# Patient Record
Sex: FEMALE | Race: WHITE | NOT HISPANIC OR LATINO | Employment: FULL TIME | URBAN - METROPOLITAN AREA
[De-identification: names, ages, dates, MRNs, and addresses within clinical notes are randomized per-mention and may not be internally consistent; named-entity substitution may affect disease eponyms.]

---

## 2022-03-17 ENCOUNTER — APPOINTMENT (OUTPATIENT)
Dept: LAB | Facility: HOSPITAL | Age: 27
End: 2022-03-17
Attending: PLASTIC SURGERY
Payer: COMMERCIAL

## 2022-03-17 DIAGNOSIS — Z01.812 PRE-OPERATIVE LABORATORY EXAMINATION: ICD-10-CM

## 2022-03-17 LAB
APTT PPP: 31 SECONDS (ref 23–37)
BASOPHILS # BLD AUTO: 0.1 THOUSANDS/ΜL (ref 0–0.1)
BASOPHILS NFR BLD AUTO: 1 % (ref 0–1)
EOSINOPHIL # BLD AUTO: 0.1 THOUSAND/ΜL (ref 0–0.4)
EOSINOPHIL NFR BLD AUTO: 2 % (ref 0–6)
ERYTHROCYTE [DISTWIDTH] IN BLOOD BY AUTOMATED COUNT: 14 %
HCT VFR BLD AUTO: 37.4 % (ref 36–46)
HGB BLD-MCNC: 12.8 G/DL (ref 12–16)
INR PPP: 1.09 (ref 0.84–1.19)
LYMPHOCYTES # BLD AUTO: 1.4 THOUSANDS/ΜL (ref 0.5–4)
LYMPHOCYTES NFR BLD AUTO: 26 % (ref 25–45)
MCH RBC QN AUTO: 30.5 PG (ref 26–34)
MCHC RBC AUTO-ENTMCNC: 34.2 G/DL (ref 31–36)
MCV RBC AUTO: 89 FL (ref 80–100)
MONOCYTES # BLD AUTO: 0.3 THOUSAND/ΜL (ref 0.2–0.9)
MONOCYTES NFR BLD AUTO: 6 % (ref 1–10)
NEUTROPHILS # BLD AUTO: 3.6 THOUSANDS/ΜL (ref 1.8–7.8)
NEUTS SEG NFR BLD AUTO: 65 % (ref 45–65)
PLATELET # BLD AUTO: 173 THOUSANDS/UL (ref 150–450)
PMV BLD AUTO: 9.1 FL (ref 8.9–12.7)
PROTHROMBIN TIME: 13.7 SECONDS (ref 11.6–14.5)
RBC # BLD AUTO: 4.2 MILLION/UL (ref 4–5.2)
WBC # BLD AUTO: 5.5 THOUSAND/UL (ref 4.5–11)

## 2022-03-17 PROCEDURE — 36415 COLL VENOUS BLD VENIPUNCTURE: CPT

## 2022-03-17 PROCEDURE — 85730 THROMBOPLASTIN TIME PARTIAL: CPT

## 2022-03-17 PROCEDURE — 85610 PROTHROMBIN TIME: CPT

## 2022-03-17 PROCEDURE — 85025 COMPLETE CBC W/AUTO DIFF WBC: CPT

## 2022-04-07 RX ORDER — CETIRIZINE HYDROCHLORIDE 10 MG/1
10 TABLET ORAL AS NEEDED
COMMUNITY

## 2022-04-07 RX ORDER — PHENOL 1.4 %
10 AEROSOL, SPRAY (ML) MUCOUS MEMBRANE AS NEEDED
COMMUNITY

## 2022-04-07 NOTE — PRE-PROCEDURE INSTRUCTIONS
Pre-Surgery Instructions:   Medication Instructions    cetirizine (ZyrTEC) 10 mg tablet Instructed patient per Anesthesia Guidelines   esomeprazole (NexIUM) 20 mg capsule Instructed patient per Anesthesia Guidelines   Melatonin 10 MG TABS Instructed patient per Anesthesia Guidelines  Pre op and bathing instructions reviewed  Pt has hibiclens  Pt  Verbalized understanding of current visitor restrictions  Pt  Verbalized an understanding of all instructions reviewed and offers no concerns at this time  Instructed to avoid all ASA/NSAIDs and OTC Vit/Supp from now until after surgery per anesthesia guidelines   Tylenol ok prn  Instructed to take per normal schedule except DOS  DOS may take Zyrtec and Nexium with a few sips of H2O PRN

## 2022-04-12 ENCOUNTER — ANESTHESIA EVENT (OUTPATIENT)
Dept: PERIOP | Facility: HOSPITAL | Age: 27
End: 2022-04-12
Payer: SELF-PAY

## 2022-04-12 NOTE — H&P
H&P Exam - Sarkis John 33 y o  female MRN: [id number]    Unit/Bed#:  Encounter: [id number]    Assessment:  Bilateral breast hypoplasia    Plan:  Bilateral breast augmentation    History of Present Illness   This is a 56-year-old female with bilateral hypoplastic breast   Patient has a short triangle of 16 5 cm from the nipple to sternal notch bilaterally  Was discussed with the patient regarding the size of the implant because of the high triangle a smaller implant should be used  Patient has some asymmetry of the folds of both breasts  Review of Systems   All other systems reviewed and are negative        Historical Information   Past Medical History:   Diagnosis Date    GERD (gastroesophageal reflux disease)     Seasonal allergies      Past Surgical History:   Procedure Laterality Date    MANDIBLE SURGERY      WISDOM TOOTH EXTRACTION       Social History   Social History     Substance and Sexual Activity   Alcohol Use Yes    Comment: occ monthly     Social History     Substance and Sexual Activity   Drug Use Never     Social History     Tobacco Use   Smoking Status Never Smoker   Smokeless Tobacco Never Used     E-Cigarette Use: Never User     E-Cigarette/Vaping Substances       Family History: non-contributory    Meds/Allergies   all medications and allergies reviewed  Allergies   Allergen Reactions    Treenut [Nuts - Food Allergy] Anaphylaxis    Soybean Oil - Food Allergy Rash       Objective   First Vitals:   Height: 5' 5" (165 1 cm) (04/07/22 1009)  Weight - Scale: 51 3 kg (113 lb) (04/07/22 1009)    Current Vitals:   Height: 5' 5" (165 1 cm) (04/07/22 1009)  Weight - Scale: 51 3 kg (113 lb) (04/07/22 1009)    No intake or output data in the 24 hours ending 04/12/22 1931    Invasive Devices  Report    None                 Physical Exam examination of the head ears eyes nose and throat is within normal limits heart sounds S1-S2 heard no murmurs or gallops lungs clear abdomen soft extremities are within normal limits bilateral breasts are soft no masses felt    Lab Results:   Imaging:   EKG, Pathology, and Other Studies:     Code Status: No Order  Advance Directive and Living Will:      Power of :    POLST:      Counseling / Coordination of Care:   None

## 2022-04-13 ENCOUNTER — HOSPITAL ENCOUNTER (OUTPATIENT)
Facility: HOSPITAL | Age: 27
Setting detail: OUTPATIENT SURGERY
Discharge: HOME | End: 2022-04-13
Attending: PLASTIC SURGERY | Admitting: PLASTIC SURGERY
Payer: SELF-PAY

## 2022-04-13 ENCOUNTER — ANESTHESIA (OUTPATIENT)
Dept: PERIOP | Facility: HOSPITAL | Age: 27
End: 2022-04-13
Payer: SELF-PAY

## 2022-04-13 VITALS
OXYGEN SATURATION: 99 % | SYSTOLIC BLOOD PRESSURE: 108 MMHG | HEART RATE: 53 BPM | RESPIRATION RATE: 18 BRPM | DIASTOLIC BLOOD PRESSURE: 66 MMHG | HEIGHT: 65 IN | BODY MASS INDEX: 18.83 KG/M2 | TEMPERATURE: 96.8 F | WEIGHT: 113 LBS

## 2022-04-13 DIAGNOSIS — N64.82 CONGENITAL HYPOPLASIA OF BREAST: Primary | ICD-10-CM

## 2022-04-13 PROBLEM — Z41.1 ENCOUNTER FOR COSMETIC SURGERY: Status: ACTIVE | Noted: 2022-04-13

## 2022-04-13 LAB
EXT PREGNANCY TEST URINE: NEGATIVE
EXT. CONTROL: NORMAL

## 2022-04-13 PROCEDURE — 81025 URINE PREGNANCY TEST: CPT | Performed by: PLASTIC SURGERY

## 2022-04-13 PROCEDURE — C1789 PROSTHESIS, BREAST, IMP: HCPCS | Performed by: PLASTIC SURGERY

## 2022-04-13 DEVICE — NATRELLE STY 68HP-240 HIGH PROJECTION  SMOOTH ROUND SALINE
Type: IMPLANTABLE DEVICE | Site: BREAST | Status: FUNCTIONAL
Brand: NATRELLE SALINE-FILLED BREAST IMPLANTS

## 2022-04-13 RX ORDER — PROPOFOL 10 MG/ML
INJECTION, EMULSION INTRAVENOUS CONTINUOUS PRN
Status: DISCONTINUED | OUTPATIENT
Start: 2022-04-13 | End: 2022-04-13

## 2022-04-13 RX ORDER — LIDOCAINE HYDROCHLORIDE AND EPINEPHRINE 5; 5 MG/ML; UG/ML
INJECTION, SOLUTION INFILTRATION; PERINEURAL AS NEEDED
Status: DISCONTINUED | OUTPATIENT
Start: 2022-04-13 | End: 2022-04-13 | Stop reason: HOSPADM

## 2022-04-13 RX ORDER — DEXAMETHASONE SODIUM PHOSPHATE 10 MG/ML
INJECTION, SOLUTION INTRAMUSCULAR; INTRAVENOUS AS NEEDED
Status: DISCONTINUED | OUTPATIENT
Start: 2022-04-13 | End: 2022-04-13

## 2022-04-13 RX ORDER — BUPIVACAINE HYDROCHLORIDE 5 MG/ML
INJECTION, SOLUTION PERINEURAL AS NEEDED
Status: DISCONTINUED | OUTPATIENT
Start: 2022-04-13 | End: 2022-04-13 | Stop reason: HOSPADM

## 2022-04-13 RX ORDER — ONDANSETRON 2 MG/ML
4 INJECTION INTRAMUSCULAR; INTRAVENOUS ONCE
Status: DISCONTINUED | OUTPATIENT
Start: 2022-04-13 | End: 2022-04-13 | Stop reason: HOSPADM

## 2022-04-13 RX ORDER — ONDANSETRON 2 MG/ML
4 INJECTION INTRAMUSCULAR; INTRAVENOUS ONCE AS NEEDED
Status: DISCONTINUED | OUTPATIENT
Start: 2022-04-13 | End: 2022-04-13 | Stop reason: HOSPADM

## 2022-04-13 RX ORDER — GLYCOPYRROLATE 0.2 MG/ML
INJECTION INTRAMUSCULAR; INTRAVENOUS AS NEEDED
Status: DISCONTINUED | OUTPATIENT
Start: 2022-04-13 | End: 2022-04-13

## 2022-04-13 RX ORDER — EPHEDRINE SULFATE 50 MG/ML
INJECTION INTRAVENOUS AS NEEDED
Status: DISCONTINUED | OUTPATIENT
Start: 2022-04-13 | End: 2022-04-13

## 2022-04-13 RX ORDER — FENTANYL CITRATE 50 UG/ML
INJECTION, SOLUTION INTRAMUSCULAR; INTRAVENOUS AS NEEDED
Status: DISCONTINUED | OUTPATIENT
Start: 2022-04-13 | End: 2022-04-13

## 2022-04-13 RX ORDER — CEPHALEXIN 500 MG/1
500 CAPSULE ORAL EVERY 8 HOURS SCHEDULED
Qty: 21 CAPSULE | Refills: 0 | Status: SHIPPED | OUTPATIENT
Start: 2022-04-13 | End: 2022-04-20

## 2022-04-13 RX ORDER — HYDROMORPHONE HCL/PF 1 MG/ML
1 SYRINGE (ML) INJECTION EVERY 2 HOUR PRN
Status: DISCONTINUED | OUTPATIENT
Start: 2022-04-13 | End: 2022-04-13 | Stop reason: HOSPADM

## 2022-04-13 RX ORDER — MIDAZOLAM HYDROCHLORIDE 2 MG/2ML
INJECTION, SOLUTION INTRAMUSCULAR; INTRAVENOUS AS NEEDED
Status: DISCONTINUED | OUTPATIENT
Start: 2022-04-13 | End: 2022-04-13

## 2022-04-13 RX ORDER — SCOLOPAMINE TRANSDERMAL SYSTEM 1 MG/1
1 PATCH, EXTENDED RELEASE TRANSDERMAL ONCE
Status: DISCONTINUED | OUTPATIENT
Start: 2022-04-13 | End: 2022-04-13 | Stop reason: HOSPADM

## 2022-04-13 RX ORDER — OXYCODONE HYDROCHLORIDE AND ACETAMINOPHEN 5; 325 MG/1; MG/1
1 TABLET ORAL EVERY 4 HOURS PRN
Qty: 30 TABLET | Refills: 0 | Status: SHIPPED | OUTPATIENT
Start: 2022-04-13 | End: 2022-04-23

## 2022-04-13 RX ORDER — ONDANSETRON 2 MG/ML
INJECTION INTRAMUSCULAR; INTRAVENOUS AS NEEDED
Status: DISCONTINUED | OUTPATIENT
Start: 2022-04-13 | End: 2022-04-13

## 2022-04-13 RX ORDER — MAGNESIUM HYDROXIDE 1200 MG/15ML
LIQUID ORAL AS NEEDED
Status: DISCONTINUED | OUTPATIENT
Start: 2022-04-13 | End: 2022-04-13 | Stop reason: HOSPADM

## 2022-04-13 RX ORDER — CEFAZOLIN SODIUM 1 G/50ML
1000 SOLUTION INTRAVENOUS ONCE
Status: DISCONTINUED | OUTPATIENT
Start: 2022-04-13 | End: 2022-04-13 | Stop reason: HOSPADM

## 2022-04-13 RX ORDER — ROCURONIUM BROMIDE 10 MG/ML
INJECTION, SOLUTION INTRAVENOUS AS NEEDED
Status: DISCONTINUED | OUTPATIENT
Start: 2022-04-13 | End: 2022-04-13

## 2022-04-13 RX ORDER — SODIUM CHLORIDE, SODIUM LACTATE, POTASSIUM CHLORIDE, CALCIUM CHLORIDE 600; 310; 30; 20 MG/100ML; MG/100ML; MG/100ML; MG/100ML
125 INJECTION, SOLUTION INTRAVENOUS CONTINUOUS
Status: DISCONTINUED | OUTPATIENT
Start: 2022-04-13 | End: 2022-04-13 | Stop reason: HOSPADM

## 2022-04-13 RX ORDER — PROPOFOL 10 MG/ML
INJECTION, EMULSION INTRAVENOUS AS NEEDED
Status: DISCONTINUED | OUTPATIENT
Start: 2022-04-13 | End: 2022-04-13

## 2022-04-13 RX ORDER — FENTANYL CITRATE/PF 50 MCG/ML
25 SYRINGE (ML) INJECTION
Status: DISCONTINUED | OUTPATIENT
Start: 2022-04-13 | End: 2022-04-13 | Stop reason: HOSPADM

## 2022-04-13 RX ORDER — CEFAZOLIN SODIUM 1 G/50ML
SOLUTION INTRAVENOUS AS NEEDED
Status: DISCONTINUED | OUTPATIENT
Start: 2022-04-13 | End: 2022-04-13

## 2022-04-13 RX ORDER — REMIFENTANIL HYDROCHLORIDE 1 MG/ML
INJECTION, POWDER, LYOPHILIZED, FOR SOLUTION INTRAVENOUS CONTINUOUS PRN
Status: DISCONTINUED | OUTPATIENT
Start: 2022-04-13 | End: 2022-04-13

## 2022-04-13 RX ORDER — OXYCODONE HYDROCHLORIDE AND ACETAMINOPHEN 5; 325 MG/1; MG/1
1 TABLET ORAL EVERY 4 HOURS PRN
Status: DISCONTINUED | OUTPATIENT
Start: 2022-04-13 | End: 2022-04-13 | Stop reason: HOSPADM

## 2022-04-13 RX ORDER — SUCCINYLCHOLINE/SOD CL,ISO/PF 100 MG/5ML
SYRINGE (ML) INTRAVENOUS AS NEEDED
Status: DISCONTINUED | OUTPATIENT
Start: 2022-04-13 | End: 2022-04-13

## 2022-04-13 RX ORDER — NEOSTIGMINE METHYLSULFATE 1 MG/ML
INJECTION INTRAVENOUS AS NEEDED
Status: DISCONTINUED | OUTPATIENT
Start: 2022-04-13 | End: 2022-04-13

## 2022-04-13 RX ADMIN — DEXAMETHASONE SODIUM PHOSPHATE 10 MG: 10 INJECTION, SOLUTION INTRAMUSCULAR; INTRAVENOUS at 07:47

## 2022-04-13 RX ADMIN — ROCURONIUM BROMIDE 5 MG: 10 INJECTION, SOLUTION INTRAVENOUS at 07:34

## 2022-04-13 RX ADMIN — FENTANYL CITRATE 25 MCG: 50 INJECTION, SOLUTION INTRAMUSCULAR; INTRAVENOUS at 09:56

## 2022-04-13 RX ADMIN — MIDAZOLAM 2 MG: 1 INJECTION INTRAMUSCULAR; INTRAVENOUS at 07:28

## 2022-04-13 RX ADMIN — FENTANYL CITRATE 25 MCG: 50 INJECTION, SOLUTION INTRAMUSCULAR; INTRAVENOUS at 09:51

## 2022-04-13 RX ADMIN — FENTANYL CITRATE 25 MCG: 50 INJECTION, SOLUTION INTRAMUSCULAR; INTRAVENOUS at 09:19

## 2022-04-13 RX ADMIN — REMIFENTANIL HYDROCHLORIDE 0.12 MCG/KG/MIN: 1 INJECTION, POWDER, LYOPHILIZED, FOR SOLUTION INTRAVENOUS at 07:40

## 2022-04-13 RX ADMIN — OXYCODONE HYDROCHLORIDE AND ACETAMINOPHEN 1 TABLET: 5; 325 TABLET ORAL at 12:02

## 2022-04-13 RX ADMIN — CEFAZOLIN SODIUM 1000 MG: 1 SOLUTION INTRAVENOUS at 07:28

## 2022-04-13 RX ADMIN — FENTANYL CITRATE 25 MCG: 50 INJECTION, SOLUTION INTRAMUSCULAR; INTRAVENOUS at 09:30

## 2022-04-13 RX ADMIN — ONDANSETRON 4 MG: 2 INJECTION INTRAMUSCULAR; INTRAVENOUS at 07:47

## 2022-04-13 RX ADMIN — Medication 100 MG: at 07:43

## 2022-04-13 RX ADMIN — EPHEDRINE SULFATE 5 MG: 50 INJECTION, SOLUTION INTRAVENOUS at 09:03

## 2022-04-13 RX ADMIN — PROPOFOL 140 MCG/KG/MIN: 10 INJECTION, EMULSION INTRAVENOUS at 07:39

## 2022-04-13 RX ADMIN — SODIUM CHLORIDE, SODIUM LACTATE, POTASSIUM CHLORIDE, AND CALCIUM CHLORIDE: .6; .31; .03; .02 INJECTION, SOLUTION INTRAVENOUS at 06:46

## 2022-04-13 RX ADMIN — GLYCOPYRROLATE 0.2 MG: 0.2 INJECTION, SOLUTION INTRAMUSCULAR; INTRAVENOUS at 07:58

## 2022-04-13 RX ADMIN — PROPOFOL 200 MG: 10 INJECTION, EMULSION INTRAVENOUS at 07:34

## 2022-04-13 RX ADMIN — GLYCOPYRROLATE 0.2 MG: 0.2 INJECTION, SOLUTION INTRAMUSCULAR; INTRAVENOUS at 09:32

## 2022-04-13 RX ADMIN — SODIUM CHLORIDE, SODIUM LACTATE, POTASSIUM CHLORIDE, AND CALCIUM CHLORIDE 125 ML/HR: .6; .31; .03; .02 INJECTION, SOLUTION INTRAVENOUS at 09:54

## 2022-04-13 RX ADMIN — NEOSTIGMINE METHYLSULFATE 2 MG: 1 INJECTION INTRAVENOUS at 09:32

## 2022-04-13 RX ADMIN — EPHEDRINE SULFATE 5 MG: 50 INJECTION, SOLUTION INTRAVENOUS at 08:17

## 2022-04-13 RX ADMIN — FENTANYL CITRATE 50 MCG: 50 INJECTION, SOLUTION INTRAMUSCULAR; INTRAVENOUS at 07:34

## 2022-04-13 NOTE — OP NOTE
OPERATIVE REPORT  PATIENT NAME: Geovany Hinton    :    MRN: [id number]  Pt Location:  OR ROOM 07    SURGERY DATE: 2022    Surgeon(s) and Role:     * Horace Smith MD - Primary    Preop Diagnosis:  Encounter for cosmetic surgery [Z41 1]    Post-Op Diagnosis Codes:     * Encounter for cosmetic surgery [Z41 1]    Procedure(s) (LRB):  BREAST AUGMENTATION (Bilateral)    Specimen(s):  * No specimens in log *    Estimated Blood Loss:   Minimal    Drains:  Closed/Suction Drain Right Breast Bulb 10 Fr  (Active)   Number of days: 0       Closed/Suction Drain Left Breast 10 Fr  (Active)   Number of days: 0       Anesthesia Type:   General    Operative Indications:  Encounter for cosmetic surgery [Z41 1]  Bilateral breast hypoplasia    Operative Findings:  Normal    Complications:   None    Procedure and Technique:  Patient was marked in the holding area for transaxillary subpectoral augmentation mammoplasty  Draped and prepped in normal sterile fashion after general endotracheal anesthesia  Patient was injected with local anesthesia and then incision was made the apex of the axilla carried down along the lateral border of pectoralis major muscle in the subcutaneous plane posterior sheath the pectoralis major was entered submuscular pocket was dissected a Sizer was placed the pocket was refined the pocket was then irrigated with normal saline Betadine and normal saline to clear inspected with a lighted retractor there was no bleeding a 10  Channel drain was placed through separate stab wound in the axilla placed in the inferior pocket of the breast   A Allergan saline 68 high-profile 240 cc implant was placed and filled to 260 cc of normal saline    Identical procedure was performed on both breast the skin was then closed with a running subcuticular 3-0 Prolene suture and interrupted 6 0 nylon sutures patient tolerated the procedure well   I was present for the entire procedure    Patient Disposition:  PACU       SIGNATURE: Gill Howard MD  DATE: April 13, 2022  TIME: 9:41 AM

## 2022-04-13 NOTE — DISCHARGE INSTRUCTIONS
THIS IS CONSIDERED MAJOR SURGERY  PLEASE ACT ACCORDINGLY  THE FOLLOWING INSTRUCTIONS ARE INTENDED AS A GUIDE FOR YOU TO FOLLOW AT HOME  THEY ARE NOT INTENDED AS A SUBSTITUTE FOR MEDICAL CARE  AFTER SURGERY:    1  Following surgery before you're discharged from the short procedure unit the nurse will instruct you on how to activate your drains  If you have EXCESSIVE DRAINAGE, meaning that the BULBS fill up twice in 1 hour, please call Dr Delma Horowitz for further instructions  The drains will be removed the day after surgery in Dr Luis Renee office  If the surgery is on a Friday the drains will be removed on Monday  2  For your protection, we recommend that someone spend the first night with you because of medications prescribed and for assistance getting out of bed  3  Leave your dressings in place; Dr Delma Horowitz will change them the following day when drains are removed  After your first visit, no dressings are necessary  4  You may shower after your drains are removed; wash gently with soap and water  Do note use deodorant powder until after the sutures are removed  It is very important you start move the implants as instructed on the third postoperative day and continue to do breast massages as long as you have implants  5  Fill your prescriptions and taking medications as directed  A very deep, sore muscular pain it is associated with this type of surgery and this is normal   Pain medication will decrease the amount of pain that you have, but will not totally take it away, this is normal   Do not drive while taking pain medication  6  If your more comfortable wearing her side this is permissible; however, do not sleep on his stomach for 2 weeks following surgery  7  Any signs of bleeding or rash should be reported to the office  8  If one breast becomes considerably larger was harder than the other, please call the office immediately  FOLLOW-UP CARE:    1   On your first postoperative Dr Delma Horowitz will instruct you on how to massage our implants & remove your draings  2  It is recommended that you do not wear a bra for at least 3 weeks after surgery to allow the implants to drop down into the pocket  3  Stitches will be removed on the 7th postoperative day  4  Continue breast massage at least 3-4 times a day for as long as you have the implants  5  Never use a Heating Pad or Microwave Beads!       IF YOU HAVE ANY PROBLEMS OR QUESTIONS, PLEASE DO NOT HESITATE TO CALL THE OFFICE     [phone]      Your first postoperative appointment will be tomorrow

## 2022-04-13 NOTE — ANESTHESIA POSTPROCEDURE EVALUATION
Post-Op Assessment Note    CV Status:  Stable  Pain Score: 7    Pain management: adequate     Mental Status:  Alert and awake   Hydration Status:  Euvolemic   PONV Controlled:  Controlled   Airway Patency:  Patent      Post Op Vitals Reviewed: Yes            No complications documented      BP      Temp     Pulse     Resp      SpO2

## 2022-04-13 NOTE — NURSING NOTE
Pt is awake,alert,tolerated diet, OOB to BR, voided QS, written and verbal instructions given, pt verbalizes an understanding  Medicated for pain

## 2022-12-19 ENCOUNTER — APPOINTMENT (OUTPATIENT)
Dept: LAB | Facility: HOSPITAL | Age: 27
End: 2022-12-19
Attending: PLASTIC SURGERY

## 2022-12-19 DIAGNOSIS — Z01.812 PRE-OPERATIVE LABORATORY EXAMINATION: ICD-10-CM

## 2022-12-19 LAB
APTT PPP: 28 SECONDS (ref 23–37)
BASOPHILS # BLD AUTO: 0.04 THOUSANDS/ÂΜL (ref 0–0.1)
BASOPHILS NFR BLD AUTO: 0 % (ref 0–1)
EOSINOPHIL # BLD AUTO: 0.42 THOUSAND/ÂΜL (ref 0–0.61)
EOSINOPHIL NFR BLD AUTO: 4 % (ref 0–6)
ERYTHROCYTE [DISTWIDTH] IN BLOOD BY AUTOMATED COUNT: 11.9 % (ref 11.6–15.1)
HCT VFR BLD AUTO: 44.6 % (ref 34.8–46.1)
HGB BLD-MCNC: 14.2 G/DL (ref 11.5–15.4)
IMM GRANULOCYTES # BLD AUTO: 0.02 THOUSAND/UL (ref 0–0.2)
IMM GRANULOCYTES NFR BLD AUTO: 0 % (ref 0–2)
INR PPP: 0.98 (ref 0.84–1.19)
LYMPHOCYTES # BLD AUTO: 2.48 THOUSANDS/ÂΜL (ref 0.6–4.47)
LYMPHOCYTES NFR BLD AUTO: 25 % (ref 14–44)
MCH RBC QN AUTO: 30.6 PG (ref 26.8–34.3)
MCHC RBC AUTO-ENTMCNC: 31.8 G/DL (ref 31.4–37.4)
MCV RBC AUTO: 96 FL (ref 82–98)
MONOCYTES # BLD AUTO: 0.66 THOUSAND/ÂΜL (ref 0.17–1.22)
MONOCYTES NFR BLD AUTO: 7 % (ref 4–12)
NEUTROPHILS # BLD AUTO: 6.49 THOUSANDS/ÂΜL (ref 1.85–7.62)
NEUTS SEG NFR BLD AUTO: 64 % (ref 43–75)
NRBC BLD AUTO-RTO: 0 /100 WBCS
PLATELET # BLD AUTO: 166 THOUSANDS/UL (ref 149–390)
PMV BLD AUTO: 10.3 FL (ref 8.9–12.7)
PROTHROMBIN TIME: 13.3 SECONDS (ref 11.6–14.5)
RBC # BLD AUTO: 4.64 MILLION/UL (ref 3.81–5.12)
WBC # BLD AUTO: 10.11 THOUSAND/UL (ref 4.31–10.16)

## 2022-12-22 RX ORDER — DIPHENOXYLATE HYDROCHLORIDE AND ATROPINE SULFATE 2.5; .025 MG/1; MG/1
1 TABLET ORAL DAILY
COMMUNITY
End: 2022-12-29

## 2022-12-22 NOTE — PRE-PROCEDURE INSTRUCTIONS
Pre-Surgery Instructions:   Medication Instructions   • cetirizine (ZyrTEC) 10 mg tablet Uses PRN- OK to take day of surgery   • esomeprazole (NexIUM) 20 mg capsule Uses PRN- OK to take day of surgery   • FIBER ADULT GUMMIES PO Stop taking 7 days prior to surgery  • multivitamin (THERAGRAN) TABS Stop taking 7 days prior to surgery  Preop Instructions per My Surgical Experience booklet,medications per anesthesia guidelines and showering instructions per [facility] protocol using their own soap/shampoo reviewed  Pt  Verbalized understanding of current visitor restrictions  Instructed to call surgeon with any illness,change in health or covid exposure now till DOS  All medications instructed to take DOS are with sips water only  Instructed to avoid all ASA and OTC Vit/Supp 1 week prior to surgery and to avoid NSAIDs 3 days prior to surgery per anesthesia instructions  Tylenol ok to take prn  Pt  Verbalized an understanding of all instructions reviewed and offers no concerns at this time

## 2022-12-28 ENCOUNTER — ANESTHESIA EVENT (OUTPATIENT)
Dept: PERIOP | Facility: HOSPITAL | Age: 27
End: 2022-12-28

## 2022-12-28 PROBLEM — K21.9 GASTROESOPHAGEAL REFLUX DISEASE: Status: ACTIVE | Noted: 2022-12-28

## 2022-12-28 NOTE — ANESTHESIA PREPROCEDURE EVALUATION
Procedure:  RHINOPLASTY (Nose)    Relevant Problems   ANESTHESIA (within normal limits)      CARDIO (within normal limits)      GI/HEPATIC   (+) Gastroesophageal reflux disease      /RENAL (within normal limits)      HEMATOLOGY (within normal limits)      PULMONARY (within normal limits)      Other   (+) Encounter for cosmetic surgery        Physical Exam    Airway    Mallampati score: I  TM Distance: >3 FB  Neck ROM: full     Dental   Comment: Had mandible advanced procedure ,     Cardiovascular  Cardiovascular exam normal    Pulmonary  Pulmonary exam normal     Other Findings        Anesthesia Plan  ASA Score- 2     Anesthesia Type- general with ASA Monitors  Additional Monitors:   Airway Plan: ETT  Plan Factors-Exercise tolerance (METS): >4 METS  Chart reviewed  Existing labs reviewed  Patient summary reviewed  Patient is not a current smoker  Patient not instructed to abstain from smoking on day of procedure  Patient did not smoke on day of surgery  Induction- intravenous  Postoperative Plan- Plan for postoperative opioid use  Informed Consent- Anesthetic plan and risks discussed with patient  I personally reviewed this patient with the CRNA  Discussed and agreed on the Anesthesia Plan with the CRNA  Chris Redmond           No results found for: HGBA1C    No results found for: NA, K, CL, CO2, ANIONGAP, BUN, CREATININE, GLUCOSE, GLUF, CALCIUM, CORRECTEDCA, AST, ALT, ALKPHOS, PROT, BILITOT, EGFR    Lab Results   Component Value Date    WBC 10 11 12/19/2022    HGB 14 2 12/19/2022    HCT 44 6 12/19/2022    MCV 96 12/19/2022     12/19/2022

## 2022-12-28 NOTE — H&P
H&P Exam - Aurelio Minium 33 y o  female MRN: [id number]    Unit/Bed#:  Encounter: [id number]    Assessment:  Nasal airway obstruction nasal deformity    Plan:  Rhinoplasty excision of inferior turbinates septoplasty    History of Present Illness   This is a 67-year-old female who with who desires a rhinoplasty and have some relief from her nasal airway obstruction    Review of Systems   All other systems reviewed and are negative        Historical Information   Past Medical History:   Diagnosis Date   • GERD (gastroesophageal reflux disease)    • Seasonal allergies      Past Surgical History:   Procedure Laterality Date   • COLONOSCOPY     • EGD     • MANDIBLE SURGERY     • [place] BREAST AUGMENTATION WITH IMPLANT Bilateral 04/13/2022    Procedure: BREAST AUGMENTATION;  Surgeon: Neto Hernandez MD;  Location: [address] [place] [address];  Service: Plastics   • WISDOM TOOTH EXTRACTION       Social History   Social History     Substance and Sexual Activity   Alcohol Use Yes    Comment: Socially     Social History     Substance and Sexual Activity   Drug Use Never     Social History     Tobacco Use   Smoking Status Never   Smokeless Tobacco Never     E-Cigarette Use: Never User     E-Cigarette/Vaping Substances       Family History: non-contributory    Meds/Allergies   all medications and allergies reviewed  Allergies   Allergen Reactions   • Treenut [Nuts - Food Allergy] Anaphylaxis   • Soybean Oil - Food Allergy Rash       Objective   First Vitals:   Height: 5' 5" (165 1 cm) (12/22/22 0819)  Weight - Scale: 52 2 kg (115 lb) (12/22/22 0819)    Current Vitals:   Height: 5' 5" (165 1 cm) (12/22/22 0819)  Weight - Scale: 52 2 kg (115 lb) (12/22/22 0819)    No intake or output data in the 24 hours ending 12/28/22 1758    Invasive Devices     Drain  Duration           Closed/Suction Drain Left Breast 10 Fr  259 days    Closed/Suction Drain Right Breast Bulb 10 Fr  259 days                Physical Exam examination of the head ears eyes nose and throat is within normal limits heart sounds S1-S2 heard no murmurs or gallops lungs clear abdomen soft extremities are within normal limits patient has a slightly large nose prominent dorsal hump bilateral inferior turbinate hypertrophy with a deviated septum    Lab Results:   Imaging:   EKG, Pathology, and Other Studies:     Code Status: No Order  Advance Directive and Living Will:      Power of :    POLST:      Counseling / Coordination of Care:   None

## 2022-12-29 ENCOUNTER — ANESTHESIA (OUTPATIENT)
Dept: PERIOP | Facility: HOSPITAL | Age: 27
End: 2022-12-29

## 2022-12-29 ENCOUNTER — HOSPITAL ENCOUNTER (OUTPATIENT)
Facility: HOSPITAL | Age: 27
Setting detail: OUTPATIENT SURGERY
Discharge: HOME | End: 2022-12-29
Attending: PLASTIC SURGERY | Admitting: PLASTIC SURGERY

## 2022-12-29 VITALS
TEMPERATURE: 98.2 F | WEIGHT: 115 LBS | DIASTOLIC BLOOD PRESSURE: 85 MMHG | HEART RATE: 92 BPM | OXYGEN SATURATION: 97 % | SYSTOLIC BLOOD PRESSURE: 131 MMHG | HEIGHT: 65 IN | BODY MASS INDEX: 19.16 KG/M2 | RESPIRATION RATE: 18 BRPM

## 2022-12-29 DIAGNOSIS — J34.2 DEVIATED SEPTUM: Primary | ICD-10-CM

## 2022-12-29 LAB
EXT PREGNANCY TEST URINE: NEGATIVE
EXT. CONTROL: NORMAL

## 2022-12-29 RX ORDER — DEXAMETHASONE SODIUM PHOSPHATE 10 MG/ML
INJECTION, SOLUTION INTRAMUSCULAR; INTRAVENOUS AS NEEDED
Status: DISCONTINUED | OUTPATIENT
Start: 2022-12-29 | End: 2022-12-29

## 2022-12-29 RX ORDER — MIDAZOLAM HYDROCHLORIDE 2 MG/2ML
INJECTION, SOLUTION INTRAMUSCULAR; INTRAVENOUS AS NEEDED
Status: DISCONTINUED | OUTPATIENT
Start: 2022-12-29 | End: 2022-12-29

## 2022-12-29 RX ORDER — CEFAZOLIN SODIUM 1 G/50ML
1000 SOLUTION INTRAVENOUS ONCE
Status: COMPLETED | OUTPATIENT
Start: 2022-12-29 | End: 2022-12-29

## 2022-12-29 RX ORDER — ONDANSETRON 2 MG/ML
INJECTION INTRAMUSCULAR; INTRAVENOUS AS NEEDED
Status: DISCONTINUED | OUTPATIENT
Start: 2022-12-29 | End: 2022-12-29

## 2022-12-29 RX ORDER — GINSENG 100 MG
CAPSULE ORAL AS NEEDED
Status: DISCONTINUED | OUTPATIENT
Start: 2022-12-29 | End: 2022-12-29 | Stop reason: HOSPADM

## 2022-12-29 RX ORDER — ONDANSETRON 2 MG/ML
4 INJECTION INTRAMUSCULAR; INTRAVENOUS ONCE
Status: DISCONTINUED | OUTPATIENT
Start: 2022-12-29 | End: 2022-12-29 | Stop reason: HOSPADM

## 2022-12-29 RX ORDER — LIDOCAINE HYDROCHLORIDE 10 MG/ML
INJECTION, SOLUTION EPIDURAL; INFILTRATION; INTRACAUDAL; PERINEURAL AS NEEDED
Status: DISCONTINUED | OUTPATIENT
Start: 2022-12-29 | End: 2022-12-29

## 2022-12-29 RX ORDER — SODIUM CHLORIDE, SODIUM LACTATE, POTASSIUM CHLORIDE, CALCIUM CHLORIDE 600; 310; 30; 20 MG/100ML; MG/100ML; MG/100ML; MG/100ML
50 INJECTION, SOLUTION INTRAVENOUS CONTINUOUS
Status: DISCONTINUED | OUTPATIENT
Start: 2022-12-29 | End: 2022-12-29 | Stop reason: HOSPADM

## 2022-12-29 RX ORDER — MAGNESIUM HYDROXIDE 1200 MG/15ML
LIQUID ORAL AS NEEDED
Status: DISCONTINUED | OUTPATIENT
Start: 2022-12-29 | End: 2022-12-29 | Stop reason: HOSPADM

## 2022-12-29 RX ORDER — ONDANSETRON 2 MG/ML
4 INJECTION INTRAMUSCULAR; INTRAVENOUS ONCE AS NEEDED
Status: DISCONTINUED | OUTPATIENT
Start: 2022-12-29 | End: 2022-12-29 | Stop reason: HOSPADM

## 2022-12-29 RX ORDER — SODIUM CHLORIDE, SODIUM LACTATE, POTASSIUM CHLORIDE, CALCIUM CHLORIDE 600; 310; 30; 20 MG/100ML; MG/100ML; MG/100ML; MG/100ML
75 INJECTION, SOLUTION INTRAVENOUS CONTINUOUS
Status: DISCONTINUED | OUTPATIENT
Start: 2022-12-29 | End: 2022-12-29 | Stop reason: HOSPADM

## 2022-12-29 RX ORDER — OXYCODONE HYDROCHLORIDE AND ACETAMINOPHEN 5; 325 MG/1; MG/1
1 TABLET ORAL EVERY 4 HOURS PRN
Qty: 20 TABLET | Refills: 0 | Status: SHIPPED | OUTPATIENT
Start: 2022-12-29 | End: 2023-01-08

## 2022-12-29 RX ORDER — FENTANYL CITRATE/PF 50 MCG/ML
25 SYRINGE (ML) INJECTION
Status: DISCONTINUED | OUTPATIENT
Start: 2022-12-29 | End: 2022-12-29 | Stop reason: HOSPADM

## 2022-12-29 RX ORDER — BALANCED SALT SOLUTION 6.4; .75; .48; .3; 3.9; 1.7 MG/ML; MG/ML; MG/ML; MG/ML; MG/ML; MG/ML
SOLUTION OPHTHALMIC AS NEEDED
Status: DISCONTINUED | OUTPATIENT
Start: 2022-12-29 | End: 2022-12-29 | Stop reason: HOSPADM

## 2022-12-29 RX ORDER — PROPOFOL 10 MG/ML
INJECTION, EMULSION INTRAVENOUS CONTINUOUS PRN
Status: DISCONTINUED | OUTPATIENT
Start: 2022-12-29 | End: 2022-12-29

## 2022-12-29 RX ORDER — NEOMYCIN SULFATE, POLYMYXIN B SULFATE, AND DEXAMETHASONE 3.5; 10000; 1 MG/G; [USP'U]/G; MG/G
OINTMENT OPHTHALMIC AS NEEDED
Status: DISCONTINUED | OUTPATIENT
Start: 2022-12-29 | End: 2022-12-29 | Stop reason: HOSPADM

## 2022-12-29 RX ORDER — ROCURONIUM BROMIDE 10 MG/ML
INJECTION, SOLUTION INTRAVENOUS AS NEEDED
Status: DISCONTINUED | OUTPATIENT
Start: 2022-12-29 | End: 2022-12-29

## 2022-12-29 RX ORDER — CEPHALEXIN 500 MG/1
500 CAPSULE ORAL EVERY 8 HOURS SCHEDULED
Qty: 21 CAPSULE | Refills: 0 | Status: SHIPPED | OUTPATIENT
Start: 2022-12-29 | End: 2023-01-05

## 2022-12-29 RX ORDER — SODIUM CHLORIDE, SODIUM LACTATE, POTASSIUM CHLORIDE, CALCIUM CHLORIDE 600; 310; 30; 20 MG/100ML; MG/100ML; MG/100ML; MG/100ML
INJECTION, SOLUTION INTRAVENOUS CONTINUOUS PRN
Status: DISCONTINUED | OUTPATIENT
Start: 2022-12-29 | End: 2022-12-29

## 2022-12-29 RX ORDER — LIDOCAINE HYDROCHLORIDE 10 MG/ML
INJECTION, SOLUTION EPIDURAL; INFILTRATION; INTRACAUDAL; PERINEURAL AS NEEDED
Status: DISCONTINUED | OUTPATIENT
Start: 2022-12-29 | End: 2022-12-29 | Stop reason: HOSPADM

## 2022-12-29 RX ORDER — EPHEDRINE SULFATE 50 MG/ML
INJECTION INTRAVENOUS AS NEEDED
Status: DISCONTINUED | OUTPATIENT
Start: 2022-12-29 | End: 2022-12-29

## 2022-12-29 RX ORDER — FENTANYL CITRATE 50 UG/ML
INJECTION, SOLUTION INTRAMUSCULAR; INTRAVENOUS AS NEEDED
Status: DISCONTINUED | OUTPATIENT
Start: 2022-12-29 | End: 2022-12-29

## 2022-12-29 RX ORDER — PROPOFOL 10 MG/ML
INJECTION, EMULSION INTRAVENOUS AS NEEDED
Status: DISCONTINUED | OUTPATIENT
Start: 2022-12-29 | End: 2022-12-29

## 2022-12-29 RX ORDER — OXYCODONE HYDROCHLORIDE AND ACETAMINOPHEN 5; 325 MG/1; MG/1
1 TABLET ORAL EVERY 4 HOURS PRN
Status: DISCONTINUED | OUTPATIENT
Start: 2022-12-29 | End: 2022-12-29 | Stop reason: HOSPADM

## 2022-12-29 RX ORDER — COCAINE HYDROCHLORIDE 40 MG/ML
SOLUTION NASAL ONCE
Status: COMPLETED | OUTPATIENT
Start: 2022-12-29 | End: 2022-12-29

## 2022-12-29 RX ADMIN — EPHEDRINE SULFATE 10 MG: 50 INJECTION INTRAVENOUS at 09:28

## 2022-12-29 RX ADMIN — FENTANYL CITRATE 50 MCG: 50 INJECTION, SOLUTION INTRAMUSCULAR; INTRAVENOUS at 07:37

## 2022-12-29 RX ADMIN — DEXAMETHASONE SODIUM PHOSPHATE 10 MG: 10 INJECTION INTRAMUSCULAR; INTRAVENOUS at 07:57

## 2022-12-29 RX ADMIN — PROPOFOL 200 MG: 10 INJECTION, EMULSION INTRAVENOUS at 07:37

## 2022-12-29 RX ADMIN — SODIUM CHLORIDE, SODIUM LACTATE, POTASSIUM CHLORIDE, AND CALCIUM CHLORIDE: .6; .31; .03; .02 INJECTION, SOLUTION INTRAVENOUS at 07:31

## 2022-12-29 RX ADMIN — SODIUM CHLORIDE, SODIUM LACTATE, POTASSIUM CHLORIDE, AND CALCIUM CHLORIDE: .6; .31; .03; .02 INJECTION, SOLUTION INTRAVENOUS at 09:50

## 2022-12-29 RX ADMIN — CEFAZOLIN SODIUM 1000 MG: 1 SOLUTION INTRAVENOUS at 07:35

## 2022-12-29 RX ADMIN — EPHEDRINE SULFATE 5 MG: 50 INJECTION INTRAVENOUS at 08:18

## 2022-12-29 RX ADMIN — ONDANSETRON 4 MG: 2 INJECTION INTRAMUSCULAR; INTRAVENOUS at 10:17

## 2022-12-29 RX ADMIN — LIDOCAINE HYDROCHLORIDE 50 MG: 10 INJECTION, SOLUTION EPIDURAL; INFILTRATION; INTRACAUDAL; PERINEURAL at 07:37

## 2022-12-29 RX ADMIN — SODIUM CHLORIDE, SODIUM LACTATE, POTASSIUM CHLORIDE, AND CALCIUM CHLORIDE 50 ML/HR: .6; .31; .03; .02 INJECTION, SOLUTION INTRAVENOUS at 06:24

## 2022-12-29 RX ADMIN — OXYCODONE HYDROCHLORIDE AND ACETAMINOPHEN 1 TABLET: 5; 325 TABLET ORAL at 11:44

## 2022-12-29 RX ADMIN — EPHEDRINE SULFATE 5 MG: 50 INJECTION INTRAVENOUS at 09:07

## 2022-12-29 RX ADMIN — MIDAZOLAM 2 MG: 1 INJECTION INTRAMUSCULAR; INTRAVENOUS at 07:31

## 2022-12-29 RX ADMIN — ROCURONIUM BROMIDE 40 MG: 10 INJECTION, SOLUTION INTRAVENOUS at 07:37

## 2022-12-29 RX ADMIN — PROPOFOL 110 MCG/KG/MIN: 10 INJECTION, EMULSION INTRAVENOUS at 07:42

## 2022-12-29 RX ADMIN — FENTANYL CITRATE 50 MCG: 50 INJECTION, SOLUTION INTRAMUSCULAR; INTRAVENOUS at 10:34

## 2022-12-29 NOTE — ANESTHESIA POSTPROCEDURE EVALUATION
Post-Op Assessment Note    CV Status:  Stable    Pain management: adequate     Mental Status:  Alert and awake   Hydration Status:  Euvolemic   PONV Controlled:  Controlled   Airway Patency:  Patent      Post Op Vitals Reviewed: Yes      Staff: CRNA         No notable events documented      /72 (12/29/22 1051)    Temp 99 3 °F (37 4 °C) (12/29/22 1051)    Pulse (!) 108 (12/29/22 1051)   Resp 16 (12/29/22 1051)    SpO2 95 % (12/29/22 1051)

## 2022-12-29 NOTE — NURSING NOTE
Patient and Mom asking to go home, Dr Eloy Bean office called and message left, awaiting his arrival to remove packing

## 2022-12-29 NOTE — DISCHARGE INSTR - AVS FIRST PAGE
THIS IS CONSIDERED MAJOR SURGERY  PLEASE ACT ACCORDINGLY  THE FOLLOWING INSTRUCTIONS ARE INTENDED AS A GUIDE FOR YOU TO FOLLOW AT HOME  THEY ARE NOT INTENDED AS A SUBSTITUTE FOR MEDICAL CARE  24-48 HOURS AFTER SURGERY:    Following surgery, go directly home and remain quiet in bed for 24 hours  Only with assistance may you get up to use the bathroom  When lying or sleeping, please use several pillows to keep your head elevated  For your protection, we recommend that someone spend the first night with you because of medications prescribed  Start oral intake slowly  Drink as much water, carbonated beverages, and other clear liquids as you desire  Eat only light soft and cool foods for several days following surgery  Do no do any excessive  Nausea and vomiting are occasionally present after an anesthetic, so do not be alarmed if this should occur  Fill your prescriptions and take your medication as directed  If you have excessive pain which is not relieved by the medications, call the office  Dizziness is common when taking pain medication  For this reason we strongly advise against driving while taking this medication  You will have a splint type dressing over your nose and possible packing inside each nostril  Do not remove any dressing  You will also have a small gauze pad under your nose  This is referred to as a drip pad will be taped on and can be changed as needed  Moderate bledding from your nose is normal  The gauze dressing will collect the blood and should be changed only when saturated  Iced gauze pads are to be placed over the eyes to help reduce swelling and discoloration  They should be used continuously for at least 24 hours  The outside of the nostrils may be cleaned with Q-tips soaked in hydrogen peroxide  A Humidifier should be placed in the room while sleeping  FOLLOW-UP CARE:    DO NOT blow your nose and try to sneeze 7-10 days   If you have to sneeze, open your mouth and try to sneeze through your mouth  Lubricate your nostrils, as needed with Q-tips and Vaseline to soften hard, crusted areas  You can expect some light blood tinged drainage from the nose for several days  If your nose begins to bleed, apply a moderate amount of pressure and place ice packs on your nose to constrict the blood vessels  If bleeding perfsists call the office immediately  If there is excessive pain, excessive bleeding, injury to the nose, or a temperature of 101 degrees or higher, call the office  Do not travel by airplane for 7-10 days following surgery      HEALING CARE:    After the dressing are removed, avoid striking or bumping your nose and try not roll on it while sleeping  Do not rest your glasses on the bridge of your nose before consulting with the office  They can alter the final structural framework of you nose  Contact lenses can be worn after the swelling subsides  Alcohol and tobacco can prolong swelling and healing so make an effort to avoid it  Do not expose your nose to the sun for 4-6 weeks  Use a sunscreen for six months after surgery  Avoid swimming for one month and diving for two months after surgery  Avoid any physical exercise that can cause overheating or over exertion for 4-6 weeks after surgery  Your nose may be swollen and "stuffy" for several months  Complete healing may take 6-12 months  It is to your advantage to make an appointment in that time to check final result      IF YOU HAVE ANY PROBLEMS OR QUESTIONS, PLEASE DO NOT HESITATE TO CALL THE OFFICE     [phone]      Your first postoperative appointment will be

## 2022-12-29 NOTE — OP NOTE
OPERATIVE REPORT  PATIENT NAME: Jona Rojas    :  [id number]  MRN: [id number]  Pt Location:  OR ROOM 07    SURGERY DATE: 2022    Surgeon(s) and Role:     * Paulie Dorsey MD - Primary    Preop Diagnosis:  Encounter for cosmetic surgery [Z41 1]    Post-Op Diagnosis Codes:     * Encounter for cosmetic surgery [Z41 1]    Procedure(s) (LRB):  RHINOPLASTY/SEPTOPLASTY (N/A)    Specimen(s):  * No specimens in log *    Estimated Blood Loss:   Minimal    Drains:  Closed/Suction Drain Right Breast Bulb 10 Fr  (Active)   Number of days: 260       Closed/Suction Drain Left Breast 10 Fr  (Active)   Number of days: 260       Anesthesia Type:   General    Operative Indications:  Encounter for cosmetic surgery [Z41 1]  Nasal airway obstruction    Operative Findings:  Deviated septum bilateral inferior turbinate hypertrophy    Complications:   None    Procedure and Technique:  Patient was draped and prepped in the normal sterile fashion after general endotracheal anesthesia  Nose was marked for rhinoplasty  The nose was packed with 4% cocaine solution and injected locally  First an intercartilaginous incision was made and the lower lateral cartilages were trimmed  The dorsum was then rasped and the cartilaginous portion of the dorsum was excised to premarked area  After allowing a dorsal hump  The incisions were then closed with 6-0 Vicryl suture  Next the incision was made approximately 1 cm anterior to the caudal end  With a caudal elevator sides of the deviated portion of the septum were elevated and the cartilaginous and bony portion of the deviated portion of the septum were removed  Incision was then closed with a 6-0 nylon suture    Bilateral nasal bone osteotomies were performed [facility] splint applied packing placed in the nose were performed   I was present for the entire procedure    Patient Disposition:  PACU         SIGNATURE: Paulie Dorsey MD  DATE: 2022  TIME: 10:42 AM

## 2022-12-29 NOTE — INTERVAL H&P NOTE
H&P reviewed  After examining the patient I find no changes in the patients condition since the H&P had been written      Vitals:    12/29/22 0613   BP: 102/66   Pulse: 60   Resp: 18   Temp: 97 9 °F (36 6 °C)   SpO2: 100%

## 2022-12-29 NOTE — NURSING NOTE
Dr Severiano Lex to bedside to remove packing, patient tolerated procedure well, no active bleeding noted at present  Drip pad placed, new ice pack placed over eyes   Mom at bedside

## 2022-12-29 NOTE — NURSING NOTE
Pt returned to [facility] awake,alert,IV infusing, assisted OOB to BR, voided  Medicated for 7/10 operative pain, taking clear liquids  HOB elevated, Ice pack on

## 2023-09-26 NOTE — ANESTHESIA PREPROCEDURE EVALUATION
Procedure:  BREAST AUGMENTATION (Bilateral Breast)    Relevant Problems   No relevant active problems        Physical Exam    Airway  Comment: She has mandible procedure recently  Mallampati score: II  TM Distance: <3 FB  Neck ROM: full     Dental   No notable dental hx     Cardiovascular  Cardiovascular exam normal    Pulmonary  Pulmonary exam normal     Other Findings        Anesthesia Plan  ASA Score- 2     Anesthesia Type- general with ASA Monitors  Additional Monitors:   Airway Plan: ETT  Plan Factors-Exercise tolerance (METS): >4 METS  Chart reviewed  Existing labs reviewed  Patient is not a current smoker  Patient not instructed to abstain from smoking on day of procedure  Patient did not smoke on day of surgery  Induction- intravenous  Postoperative Plan-     Informed Consent- Anesthetic plan and risks discussed with patient  I personally reviewed this patient with the CRNA  Discussed and agreed on the Anesthesia Plan with the CRNA  Eliezer Chaney
Previously Declined (within the last year)

## (undated) DEVICE — STERILE POLYISOPRENE POWDER-FREE SURGICAL GLOVES: Brand: PROTEXIS

## (undated) DEVICE — SYRINGE 10ML LL CONTROL TOP

## (undated) DEVICE — SCD SEQUENTIAL COMPRESSION COMFORT SLEEVE MEDIUM KNEE LENGTH: Brand: KENDALL SCD

## (undated) DEVICE — SUT PROLENE 3-0 PC-5 18 IN 8632G

## (undated) DEVICE — SKIN MARKER DUAL TIP WITH RULER CAP, FLEXIBLE RULER AND LABELS: Brand: DEVON

## (undated) DEVICE — 3M™ STERI-STRIP™ REINFORCED ADHESIVE SKIN CLOSURES, R1547, 1/2 IN X 4 IN (12 MM X 100 MM), 6 STRIPS/ENVELOPE: Brand: 3M™ STERI-STRIP™

## (undated) DEVICE — REUSABLE GEL PACKINSULATEDMEDIUM5.1 X 10.5 IN.: Brand: CARDINAL HEALTH

## (undated) DEVICE — ICE PACK EYE

## (undated) DEVICE — SINGLE PORT MANIFOLD: Brand: NEPTUNE 2

## (undated) DEVICE — OCCLUSIVE GAUZE STRIP,3% BISMUTH TRIBROMOPHENATE IN PETROLATUM BLEND: Brand: XEROFORM

## (undated) DEVICE — GLOVE SRG LF STRL BGL SKNSNS 8.5 PF

## (undated) DEVICE — TUBING SUCTION 5MM X 12 FT

## (undated) DEVICE — NEEDLE BLUNT 18 G X 1 1/2IN

## (undated) DEVICE — INTENDED FOR TISSUE SEPARATION, AND OTHER PROCEDURES THAT REQUIRE A SHARP SURGICAL BLADE TO PUNCTURE OR CUT.: Brand: BARD-PARKER ® SAFETYLOCK CARBON RIB-BACK BLADES

## (undated) DEVICE — 1820 FOAM BLOCK NEEDLE COUNTER: Brand: DEVON

## (undated) DEVICE — POV-IOD SOLUTION 4OZ BT

## (undated) DEVICE — NEEDLE 30 G X 1/2

## (undated) DEVICE — SYRINGE,TOOMEY,IRRIGATION,70CC,STERILE: Brand: MEDLINE

## (undated) DEVICE — DRAPE SHEET THREE QUARTER

## (undated) DEVICE — STERILE TOOTHBRUSH: Brand: CENTURION

## (undated) DEVICE — NEURO PATTIES 1 X 3

## (undated) DEVICE — NEEDLE 25G X 1 1/2

## (undated) DEVICE — CURITY PLAIN PACKING STRIP: Brand: CURITY

## (undated) DEVICE — SUT ETHILON 6-0 PS-3 18 IN 1665G

## (undated) DEVICE — EYE PADS 1 5/8"X2 5/8": Brand: MCKESSON

## (undated) DEVICE — COTTON TIP APPLICATORS: Brand: DEROYAL

## (undated) DEVICE — WET SKIN PREP TRAY: Brand: MEDLINE INDUSTRIES, INC.

## (undated) DEVICE — GAUZE SPONGES,16 PLY: Brand: CURITY

## (undated) DEVICE — MASTISOL LIQ ADHESIVE 2/3ML

## (undated) DEVICE — JACKSON-PRATT 100CC BULB RESERVOIR: Brand: CARDINAL HEALTH

## (undated) DEVICE — SYRINGE 50ML LL

## (undated) DEVICE — TIBURON SPLIT SHEET: Brand: CONVERTORS

## (undated) DEVICE — PACK UNIVERSAL DRAPES SUB-Q ICD

## (undated) DEVICE — DISPOSABLE OR TOWEL: Brand: CARDINAL HEALTH

## (undated) DEVICE — SIZER BRST HI PRFL SALINE FILL 280-300ML STYLE 68HP

## (undated) DEVICE — SPONGE LAP 18 X 18 IN STRL RFD

## (undated) DEVICE — SUT SILK 2-0 FS 18 IN 685G

## (undated) DEVICE — DRAPE FLUID WARMER (BIRD BATH)

## (undated) DEVICE — CATH URET RED RUB 26FR 16 IN ROBINSON

## (undated) DEVICE — STANDARD SURGICAL GOWN, L: Brand: CONVERTORS

## (undated) DEVICE — SPONGE LAP 18 X 4 IN STRL RFD

## (undated) DEVICE — SPLINT 1528111 5PK EXTERNAL NASAL SMALL

## (undated) DEVICE — BASIC PACK: Brand: CONVERTORS

## (undated) DEVICE — VALVE REFLUX AND CAP

## (undated) DEVICE — SUT VICRYL 6-0 P-1 18 IN J489G

## (undated) DEVICE — CABLE BIPOLAR DISP MEGADYNE

## (undated) DEVICE — DRAIN CHANNEL RND FULL FLUTED 10FR W/TROCAR